# Patient Record
Sex: FEMALE | Race: WHITE | URBAN - METROPOLITAN AREA
[De-identification: names, ages, dates, MRNs, and addresses within clinical notes are randomized per-mention and may not be internally consistent; named-entity substitution may affect disease eponyms.]

---

## 2023-03-09 ENCOUNTER — OFFICE VISIT (OUTPATIENT)
Dept: URBAN - METROPOLITAN AREA CLINIC 54 | Facility: CLINIC | Age: 33
End: 2023-03-09
Payer: OTHER GOVERNMENT

## 2023-03-09 ENCOUNTER — WEB ENCOUNTER (OUTPATIENT)
Dept: URBAN - METROPOLITAN AREA CLINIC 54 | Facility: CLINIC | Age: 33
End: 2023-03-09

## 2023-03-09 VITALS
WEIGHT: 262.1 LBS | HEIGHT: 63 IN | DIASTOLIC BLOOD PRESSURE: 85 MMHG | SYSTOLIC BLOOD PRESSURE: 125 MMHG | HEART RATE: 89 BPM | BODY MASS INDEX: 46.44 KG/M2 | TEMPERATURE: 98.1 F

## 2023-03-09 DIAGNOSIS — R19.7 DIARRHEA, UNSPECIFIED TYPE: ICD-10-CM

## 2023-03-09 DIAGNOSIS — R11.2 NAUSEA AND VOMITING, UNSPECIFIED VOMITING TYPE: ICD-10-CM

## 2023-03-09 DIAGNOSIS — R79.82 ELEVATED C-REACTIVE PROTEIN (CRP): ICD-10-CM

## 2023-03-09 PROBLEM — 119971000119104: Status: ACTIVE | Noted: 2023-03-09

## 2023-03-09 PROCEDURE — 99204 OFFICE O/P NEW MOD 45 MIN: CPT

## 2023-03-09 RX ORDER — SODIUM PICOSULFATE, MAGNESIUM OXIDE, AND ANHYDROUS CITRIC ACID 10; 3.5; 12 MG/160ML; G/160ML; G/160ML
TAKE 320 ML LIQUID ORAL AS DIRECTED
Qty: 320 ML | Refills: 0 | OUTPATIENT
Start: 2023-03-09 | End: 2023-03-10

## 2023-03-09 NOTE — HPI-TODAY'S VISIT:
3/9/23: Pt is a 33 yo female with PMH of anemia who presents complaining of nausea, vomiting, and diarrhea x 2 months s/p 2 ER visits for these issues. Pt is having 3-5 loose BMs every morning with occasional mucus in the stool. Denies hematochezia, melena, steatorrhea. Has daily nausea that is worse in the morning. She was having frequent vomiting which is now controlled with Zofran. Reports infrequent abd pain, but when it occurs it is epigastric radiating to RUQ. All symptoms are worse in the morning rather than postprandial. Denies frequent NSAIDs. Denies fever or unintentional weight loss. No family history of celiac disease, IBD, or CRC. Pt has EGD and cscope in 2011 that were normal. No cardiopulmonary disease. Pt has RUQ US scheduled on Monday. Labs significant for elevated CRP and negative H pylori. Otherwise unremarkable. CT x 2 with splenomegaly, fatty liver, and prominent mesenteric lymph nodes.   CT abd/pelvis 01/27/2023 1. Fatty infiltration of the liver. This can cause upper abdominal pain due to stretching of the liver capsule. This is actually less severe than on the earlier study.  2. Splenomegaly.  3. Nonspecific prominence of mesenteric lymph nodes and this raises concern for either an infectious enteritis or autoimmune disease.  4. Nonspecific prominence of the small bowel loops in the left mid abdomen without evidence for transition point or other acute pathology.

## 2023-03-09 NOTE — PHYSICAL EXAM CONSTITUTIONAL:
well developed, well nourished , obese,  in no acute distress , ambulating without difficulty , normal communication ability

## 2023-03-11 LAB
(TTG) AB, IGA: <1
(TTG) AB, IGG: <1
ANTIGLIADIN ABS, IGA: 1.2
GLIADIN (DEAMIDATED) AB (IGA): 1.2
GLIADIN (DEAMIDATED) AB (IGG): <1
IMMUNOGLOBULIN A: 160
T-TRANSGLUTAMINASE (TTG) IGA: <1

## 2023-04-06 ENCOUNTER — OFFICE VISIT (OUTPATIENT)
Dept: URBAN - METROPOLITAN AREA SURGERY CENTER 14 | Facility: SURGERY CENTER | Age: 33
End: 2023-04-06
Payer: OTHER GOVERNMENT

## 2023-04-06 ENCOUNTER — CLAIMS CREATED FROM THE CLAIM WINDOW (OUTPATIENT)
Dept: URBAN - METROPOLITAN AREA CLINIC 4 | Facility: CLINIC | Age: 33
End: 2023-04-06
Payer: OTHER GOVERNMENT

## 2023-04-06 DIAGNOSIS — K63.89 APPENDICITIS EPIPLOICA: ICD-10-CM

## 2023-04-06 DIAGNOSIS — K29.70 GASTRITIS, UNSPECIFIED, WITHOUT BLEEDING: ICD-10-CM

## 2023-04-06 DIAGNOSIS — R10.13 ABDOMINAL DISCOMFORT, EPIGASTRIC: ICD-10-CM

## 2023-04-06 DIAGNOSIS — K31.89 ACQUIRED DEFORMITY OF DUODENUM: ICD-10-CM

## 2023-04-06 DIAGNOSIS — K31.89 OTHER DISEASES OF STOMACH AND DUODENUM: ICD-10-CM

## 2023-04-06 DIAGNOSIS — K22.89 DILATATION OF ESOPHAGUS: ICD-10-CM

## 2023-04-06 DIAGNOSIS — R19.7 ACUTE DIARRHEA: ICD-10-CM

## 2023-04-06 DIAGNOSIS — K29.60 ADENOPAPILLOMATOSIS GASTRICA: ICD-10-CM

## 2023-04-06 PROCEDURE — 88312 SPECIAL STAINS GROUP 1: CPT | Performed by: PATHOLOGY

## 2023-04-06 PROCEDURE — 43239 EGD BIOPSY SINGLE/MULTIPLE: CPT | Performed by: INTERNAL MEDICINE

## 2023-04-06 PROCEDURE — 88305 TISSUE EXAM BY PATHOLOGIST: CPT | Performed by: PATHOLOGY

## 2023-04-06 PROCEDURE — G8907 PT DOC NO EVENTS ON DISCHARG: HCPCS | Performed by: INTERNAL MEDICINE

## 2023-04-06 PROCEDURE — 45380 COLONOSCOPY AND BIOPSY: CPT | Performed by: INTERNAL MEDICINE

## 2023-04-27 ENCOUNTER — DASHBOARD ENCOUNTERS (OUTPATIENT)
Age: 33
End: 2023-04-27

## 2023-04-27 ENCOUNTER — WEB ENCOUNTER (OUTPATIENT)
Dept: URBAN - METROPOLITAN AREA CLINIC 54 | Facility: CLINIC | Age: 33
End: 2023-04-27

## 2023-04-27 ENCOUNTER — OFFICE VISIT (OUTPATIENT)
Dept: URBAN - METROPOLITAN AREA CLINIC 54 | Facility: CLINIC | Age: 33
End: 2023-04-27
Payer: OTHER GOVERNMENT

## 2023-04-27 VITALS
HEIGHT: 63 IN | WEIGHT: 265 LBS | TEMPERATURE: 97.2 F | SYSTOLIC BLOOD PRESSURE: 139 MMHG | DIASTOLIC BLOOD PRESSURE: 77 MMHG | BODY MASS INDEX: 46.95 KG/M2

## 2023-04-27 DIAGNOSIS — R19.7 DIARRHEA, UNSPECIFIED TYPE: ICD-10-CM

## 2023-04-27 DIAGNOSIS — K76.0 FATTY LIVER: ICD-10-CM

## 2023-04-27 DIAGNOSIS — R79.82 ELEVATED C-REACTIVE PROTEIN (CRP): ICD-10-CM

## 2023-04-27 DIAGNOSIS — R11.2 NAUSEA AND VOMITING, UNSPECIFIED VOMITING TYPE: ICD-10-CM

## 2023-04-27 PROBLEM — 197321007: Status: ACTIVE | Noted: 2023-04-27

## 2023-04-27 PROCEDURE — 99213 OFFICE O/P EST LOW 20 MIN: CPT

## 2023-04-27 NOTE — HPI-TODAY'S VISIT:
3/9/23: Pt is a 33 yo female with PMH of anemia who presents complaining of nausea, vomiting, and diarrhea x 2 months s/p 2 ER visits for these issues. Pt is having 3-5 loose BMs every morning with occasional mucus in the stool. Denies hematochezia, melena, steatorrhea. Has daily nausea that is worse in the morning. She was having frequent vomiting which is now controlled with Zofran. Reports infrequent abd pain, but when it occurs it is epigastric radiating to RUQ. All symptoms are worse in the morning rather than postprandial. Denies frequent NSAIDs. Denies fever or unintentional weight loss. No family history of celiac disease, IBD, or CRC. Pt has EGD and cscope in 2011 that were normal. No cardiopulmonary disease. Pt has RUQ US scheduled on Monday. Labs significant for elevated CRP and negative H pylori. Otherwise unremarkable. CT x 2 with splenomegaly, fatty liver, and prominent mesenteric lymph nodes.   CT abd/pelvis 01/27/2023 1. Fatty infiltration of the liver. This can cause upper abdominal pain due to stretching of the liver capsule. This is actually less severe than on the earlier study.  2. Splenomegaly.  3. Nonspecific prominence of mesenteric lymph nodes and this raises concern for either an infectious enteritis or autoimmune disease.  4. Nonspecific prominence of the small bowel loops in the left mid abdomen without evidence for transition point or other acute pathology.  4/27/23 Follow up: Pt RTC for EGD/cscope follow up. Symptoms have improved overall. Some days she has a little bit of nausea but no vomiting. Has diarrhea occassionally which she thinks is related to food, but not as frequently as before. Negative celiac serologies and normal EGD/cscope. RUQ showed fatty liver but no gallbladder path. Did not turn in stool tests.  EGD 4/6/23: - The examined portions of the nasopharynx, oropharynx and larynx were normal. - Small hiatal hernia. - LA Grade A reflux esophagitis. - Normal stomach. Biopsied. - Normal examined duodenum. Biopsied. Biopsy: Chronic gastritis, nonspecific. No H pylori. No significant abnormality on duodenal bx.  Colonoscopy 4/6/23: - The entire examined colon is normal. Biopsied. - The examined portion of the ileum was normal. Biopsy: No significant abnormality.